# Patient Record
Sex: FEMALE | Race: WHITE | NOT HISPANIC OR LATINO | URBAN - METROPOLITAN AREA
[De-identification: names, ages, dates, MRNs, and addresses within clinical notes are randomized per-mention and may not be internally consistent; named-entity substitution may affect disease eponyms.]

---

## 2017-02-15 ENCOUNTER — OUTPATIENT (OUTPATIENT)
Dept: OUTPATIENT SERVICES | Facility: HOSPITAL | Age: 77
LOS: 1 days | End: 2017-02-15
Payer: MEDICARE

## 2017-02-15 ENCOUNTER — APPOINTMENT (OUTPATIENT)
Dept: CT IMAGING | Facility: CLINIC | Age: 77
End: 2017-02-15

## 2017-02-15 DIAGNOSIS — Z00.8 ENCOUNTER FOR OTHER GENERAL EXAMINATION: ICD-10-CM

## 2017-02-15 PROCEDURE — 71250 CT THORAX DX C-: CPT

## 2017-08-30 ENCOUNTER — APPOINTMENT (OUTPATIENT)
Dept: INTERNAL MEDICINE | Facility: CLINIC | Age: 77
End: 2017-08-30
Payer: MEDICARE

## 2017-08-30 VITALS
BODY MASS INDEX: 26.29 KG/M2 | RESPIRATION RATE: 20 BRPM | WEIGHT: 154 LBS | TEMPERATURE: 97.8 F | HEIGHT: 64 IN | SYSTOLIC BLOOD PRESSURE: 142 MMHG | OXYGEN SATURATION: 96 % | HEART RATE: 92 BPM | DIASTOLIC BLOOD PRESSURE: 76 MMHG

## 2017-08-30 DIAGNOSIS — I10 ESSENTIAL (PRIMARY) HYPERTENSION: ICD-10-CM

## 2017-08-30 DIAGNOSIS — D47.2 MONOCLONAL GAMMOPATHY: ICD-10-CM

## 2017-08-30 PROCEDURE — 94729 DIFFUSING CAPACITY: CPT

## 2017-08-30 PROCEDURE — 99214 OFFICE O/P EST MOD 30 MIN: CPT | Mod: 25

## 2017-08-30 PROCEDURE — 94060 EVALUATION OF WHEEZING: CPT

## 2017-08-30 RX ORDER — TIOTROPIUM BROMIDE AND OLODATEROL 3.124; 2.736 UG/1; UG/1
2.5-2.5 SPRAY, METERED RESPIRATORY (INHALATION)
Refills: 0 | Status: DISCONTINUED | COMMUNITY
End: 2017-08-30

## 2017-09-07 ENCOUNTER — MEDICATION RENEWAL (OUTPATIENT)
Age: 77
End: 2017-09-07

## 2018-02-19 ENCOUNTER — FORM ENCOUNTER (OUTPATIENT)
Age: 78
End: 2018-02-19

## 2018-02-20 ENCOUNTER — OUTPATIENT (OUTPATIENT)
Dept: OUTPATIENT SERVICES | Facility: HOSPITAL | Age: 78
LOS: 1 days | End: 2018-02-20
Payer: MEDICARE

## 2018-02-20 ENCOUNTER — APPOINTMENT (OUTPATIENT)
Dept: CT IMAGING | Facility: CLINIC | Age: 78
End: 2018-02-20
Payer: MEDICARE

## 2018-02-20 DIAGNOSIS — Z00.8 ENCOUNTER FOR OTHER GENERAL EXAMINATION: ICD-10-CM

## 2018-02-20 PROCEDURE — 71250 CT THORAX DX C-: CPT

## 2018-02-20 PROCEDURE — 71250 CT THORAX DX C-: CPT | Mod: 26

## 2018-02-25 ENCOUNTER — TRANSCRIPTION ENCOUNTER (OUTPATIENT)
Age: 78
End: 2018-02-25

## 2018-03-06 ENCOUNTER — APPOINTMENT (OUTPATIENT)
Dept: INTERNAL MEDICINE | Facility: CLINIC | Age: 78
End: 2018-03-06
Payer: MEDICARE

## 2018-03-06 VITALS
WEIGHT: 153 LBS | HEIGHT: 64 IN | TEMPERATURE: 98.1 F | OXYGEN SATURATION: 97 % | DIASTOLIC BLOOD PRESSURE: 80 MMHG | BODY MASS INDEX: 26.12 KG/M2 | HEART RATE: 88 BPM | SYSTOLIC BLOOD PRESSURE: 144 MMHG | RESPIRATION RATE: 18 BRPM

## 2018-03-06 PROCEDURE — 94060 EVALUATION OF WHEEZING: CPT

## 2018-03-06 PROCEDURE — 94727 GAS DIL/WSHOT DETER LNG VOL: CPT

## 2018-03-06 PROCEDURE — 99215 OFFICE O/P EST HI 40 MIN: CPT | Mod: 25

## 2018-03-06 PROCEDURE — 94729 DIFFUSING CAPACITY: CPT

## 2018-03-06 RX ORDER — METOPROLOL SUCCINATE 25 MG/1
25 TABLET, EXTENDED RELEASE ORAL DAILY
Refills: 0 | Status: ACTIVE | COMMUNITY

## 2018-03-06 RX ORDER — AMLODIPINE BESYLATE 10 MG/1
10 TABLET ORAL DAILY
Refills: 0 | Status: ACTIVE | COMMUNITY

## 2018-03-13 ENCOUNTER — APPOINTMENT (OUTPATIENT)
Dept: INTERNAL MEDICINE | Facility: CLINIC | Age: 78
End: 2018-03-13

## 2018-08-22 ENCOUNTER — FORM ENCOUNTER (OUTPATIENT)
Age: 78
End: 2018-08-22

## 2018-08-23 ENCOUNTER — APPOINTMENT (OUTPATIENT)
Dept: CT IMAGING | Facility: CLINIC | Age: 78
End: 2018-08-23
Payer: MEDICARE

## 2018-08-23 ENCOUNTER — OUTPATIENT (OUTPATIENT)
Dept: OUTPATIENT SERVICES | Facility: HOSPITAL | Age: 78
LOS: 1 days | End: 2018-08-23
Payer: MEDICARE

## 2018-08-23 DIAGNOSIS — Z00.8 ENCOUNTER FOR OTHER GENERAL EXAMINATION: ICD-10-CM

## 2018-08-23 PROCEDURE — 71250 CT THORAX DX C-: CPT

## 2018-08-23 PROCEDURE — 71250 CT THORAX DX C-: CPT | Mod: 26

## 2018-09-11 ENCOUNTER — APPOINTMENT (OUTPATIENT)
Dept: INTERNAL MEDICINE | Facility: CLINIC | Age: 78
End: 2018-09-11
Payer: MEDICARE

## 2018-09-11 VITALS
HEIGHT: 64 IN | WEIGHT: 158 LBS | BODY MASS INDEX: 26.98 KG/M2 | HEART RATE: 82 BPM | DIASTOLIC BLOOD PRESSURE: 80 MMHG | OXYGEN SATURATION: 96 % | SYSTOLIC BLOOD PRESSURE: 148 MMHG | RESPIRATION RATE: 16 BRPM | TEMPERATURE: 98.3 F

## 2018-09-11 PROCEDURE — 94729 DIFFUSING CAPACITY: CPT

## 2018-09-11 PROCEDURE — 99214 OFFICE O/P EST MOD 30 MIN: CPT | Mod: 25

## 2018-09-11 PROCEDURE — 94060 EVALUATION OF WHEEZING: CPT

## 2018-09-11 NOTE — DATA REVIEWED
[FreeTextEntry1] : Spirometric analysis with DLCO is performed. The vital capacity and flow rates are within normal limits. Bronchodilator reactivity is demonstrated. The DLCO is mildly reduced. Saturation is maintained. This represents a normal vital capacity and flow rates with a mild diffusion abnormality.\par \par CAT scan of the chest performed on 8/23/18 is reviewed. There are bilateral areas of bronchiectasis with emphysematous changes. Bilateral tree in bud opacities are present and are primarily unchanged when compared to the prior study of 2/20/18. There is slight increase in the right lower lung zone and lingula with regards to opacities. There is a new peripheral groundglass opacity measuring 8 mm in the left upper lung zone with an adjacent smaller groundglass opacity as well.

## 2018-09-11 NOTE — PLAN
[FreeTextEntry1] : 1. Continue with regimen as outlined above.\par \par 2. 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 At this time, given the fact that the majority of the findings on her CAT scan have not changed, we will continue to observe. There is a new 8mm abnormality which is groundglass with a possible solid component in the left upper lung field peripherally. An adjacent area is noted to this as well. The patient continues to remain asymptomatic, so I feel that continued observation is acceptable at this time. To this end, however, she'll undergo repeat CAT scan once again in another 6 months to reevaluate the changes. These areas have had fleeting, waxing and waning infiltrates over the past few years.\par \par 4. The medical followup with her primary care physician Dr. Shay. 5. Follow up with myself in 6 months with full pulmonary function testing and review of the most recent CAT scan of the chest at that time.

## 2018-09-11 NOTE — HISTORY OF PRESENT ILLNESS
[de-identified] : The patient comes in today for a followup evaluation, and reassessment of her pulmonary status.\par \par Overall, from a pulmonary standpoint, the patient states that she remains stable. She continues to be compliant with her Stiolto, for treatment of her underlying COPD. She remains active, and she walks on a fairly regular basis. She denies any overt dyspnea or wheezing. She denies chest pain or pleuritic pain.\par \par With regards to her bilateral tree in bud pulmonary infiltrates, she states that she also remains asymptomatic. She denies any cough or sputum production. There have been no fevers, chills, or night sweats. She remains compliant with her clarithromycin 500 mg 3 times per week. She is not having any GI distress or upset from this medication. He continues to be followed by her primary care physician. She now comes in for this assessment.

## 2018-09-11 NOTE — PHYSICAL EXAM
[General Appearance - Alert] : alert [General Appearance - In No Acute Distress] : in no acute distress [Outer Ear] : the ears and nose were normal in appearance [Oropharynx] : the oropharynx was normal [Neck Appearance] : the appearance of the neck was normal [Neck Cervical Mass (___cm)] : no neck mass was observed [Jugular Venous Distention Increased] : there was no jugular-venous distention [Thyroid Diffuse Enlargement] : the thyroid was not enlarged [Thyroid Nodule] : there were no palpable thyroid nodules [Heart Rate And Rhythm] : heart rate was normal and rhythm regular [Heart Sounds] : normal S1 and S2 [Heart Sounds Gallop] : no gallops [Murmurs] : no murmurs [Heart Sounds Pericardial Friction Rub] : no pericardial rub [FreeTextEntry1] : There is trace ankle edema [Bowel Sounds] : normal bowel sounds [Abdomen Soft] : soft [Abdomen Tenderness] : non-tender [] : no hepato-splenomegaly [Abdomen Mass (___ Cm)] : no abdominal mass palpated [Cervical Lymph Nodes Enlarged Posterior Bilaterally] : posterior cervical [Cervical Lymph Nodes Enlarged Anterior Bilaterally] : anterior cervical [Supraclavicular Lymph Nodes Enlarged Bilaterally] : supraclavicular [Nail Clubbing] : no clubbing  or cyanosis of the fingernails

## 2018-09-17 ENCOUNTER — MEDICATION RENEWAL (OUTPATIENT)
Age: 78
End: 2018-09-17

## 2018-09-17 ENCOUNTER — RX RENEWAL (OUTPATIENT)
Age: 78
End: 2018-09-17

## 2019-03-12 ENCOUNTER — FORM ENCOUNTER (OUTPATIENT)
Age: 79
End: 2019-03-12

## 2019-03-13 ENCOUNTER — OUTPATIENT (OUTPATIENT)
Dept: OUTPATIENT SERVICES | Facility: HOSPITAL | Age: 79
LOS: 1 days | End: 2019-03-13
Payer: MEDICARE

## 2019-03-13 ENCOUNTER — APPOINTMENT (OUTPATIENT)
Dept: CT IMAGING | Facility: CLINIC | Age: 79
End: 2019-03-13
Payer: MEDICARE

## 2019-03-13 DIAGNOSIS — Z00.8 ENCOUNTER FOR OTHER GENERAL EXAMINATION: ICD-10-CM

## 2019-03-13 PROCEDURE — 71250 CT THORAX DX C-: CPT

## 2019-03-13 PROCEDURE — 71250 CT THORAX DX C-: CPT | Mod: 26

## 2019-04-09 ENCOUNTER — APPOINTMENT (OUTPATIENT)
Dept: INTERNAL MEDICINE | Facility: CLINIC | Age: 79
End: 2019-04-09
Payer: MEDICARE

## 2019-04-09 ENCOUNTER — TRANSCRIPTION ENCOUNTER (OUTPATIENT)
Age: 79
End: 2019-04-09

## 2019-04-09 VITALS
HEIGHT: 64 IN | WEIGHT: 158 LBS | BODY MASS INDEX: 26.98 KG/M2 | TEMPERATURE: 98.1 F | RESPIRATION RATE: 16 BRPM | OXYGEN SATURATION: 96 % | HEART RATE: 84 BPM | SYSTOLIC BLOOD PRESSURE: 150 MMHG | DIASTOLIC BLOOD PRESSURE: 82 MMHG

## 2019-04-09 PROCEDURE — ZZZZZ: CPT

## 2019-04-09 PROCEDURE — 99215 OFFICE O/P EST HI 40 MIN: CPT | Mod: 25

## 2019-04-09 PROCEDURE — 94060 EVALUATION OF WHEEZING: CPT

## 2019-04-09 PROCEDURE — 94729 DIFFUSING CAPACITY: CPT

## 2019-04-09 PROCEDURE — 94727 GAS DIL/WSHOT DETER LNG VOL: CPT

## 2019-04-09 NOTE — PHYSICAL EXAM
[General Appearance - In No Acute Distress] : in no acute distress [General Appearance - Alert] : alert [PERRL With Normal Accommodation] : pupils were equal in size, round, and reactive to light [Sclera] : the sclera and conjunctiva were normal [Extraocular Movements] : extraocular movements were intact [Outer Ear] : the ears and nose were normal in appearance [Neck Cervical Mass (___cm)] : no neck mass was observed [Oropharynx] : the oropharynx was normal [Neck Appearance] : the appearance of the neck was normal [Thyroid Diffuse Enlargement] : the thyroid was not enlarged [Jugular Venous Distention Increased] : there was no jugular-venous distention [Thyroid Nodule] : there were no palpable thyroid nodules [Auscultation Breath Sounds / Voice Sounds] : lungs were clear to auscultation bilaterally [Heart Sounds] : normal S1 and S2 [Heart Sounds Gallop] : no gallops [Heart Rate And Rhythm] : heart rate was normal and rhythm regular [Heart Sounds Pericardial Friction Rub] : no pericardial rub [Murmurs] : no murmurs [Bowel Sounds] : normal bowel sounds [FreeTextEntry1] : There is trace ankle edema [Abdomen Soft] : soft [Abdomen Tenderness] : non-tender [] : no hepato-splenomegaly [Abdomen Mass (___ Cm)] : no abdominal mass palpated [Cervical Lymph Nodes Enlarged Posterior Bilaterally] : posterior cervical [No CVA Tenderness] : no ~M costovertebral angle tenderness [Supraclavicular Lymph Nodes Enlarged Bilaterally] : supraclavicular [Cervical Lymph Nodes Enlarged Anterior Bilaterally] : anterior cervical [Nail Clubbing] : no clubbing  or cyanosis of the fingernails

## 2019-04-09 NOTE — HISTORY OF PRESENT ILLNESS
[de-identified] : Patient comes in today for a followup evaluation, and a yearly reassessment of her pulmonary status.\par \par Overall, from a pulmonary standpoint, the patient states that she is doing well. She continues to remain compliant with her Stiolto, taking 2 puffs on a daily basis. This is for treatment of her underlying COPD. She does state that occasionally she produces sputum, but usually only once every 2 weeks. Sputum can be slightly discolored. She denies hemoptysis. She denies any dyspnea or dyspnea on exertion. She remains active but does not perform any formal type of exercise. There is no chest pain no pleuritic pain.\par \par With regards to the bilateral trend but abnormalities, she remains asymptomatic. She denies any fevers chills or night sweats. She continues to take Biaxin 500 mg 3 times per week. There have been no GI symptoms secondary to the medication. She continues to be followed by her primary care physician. She now comes in for this assessment.

## 2019-04-09 NOTE — PLAN
[FreeTextEntry1] : 1. Continue with medication as outlined above, including Stiolto and Biaxin.\par \par 2. At this time, given the relative stability of the bilateral. Both abnormalities, which are most likely infectious/inflammatory in nature (possibly do to atypical tuberculosis), we will then repeat a CAT scan of the chest in one year which would be March of 2020. There is no reason to perform a CAT scan in 6 months as there has been stability, and the previously documented 90 mm solid nodule, has completely resolved. This was most likely inflammatory in nature, possibly representing mucus plugging.\par \par 3. Routine medical followup with Dr. Shay.\par \par 4. Followup with myself in 6 months with pre-post spirometry and DLCO. We will schedule her for a followup CAT scan of the chest in March 2020 at that time.

## 2019-04-09 NOTE — DATA REVIEWED
[FreeTextEntry1] : A bone marrow function test is performed. Lung volumes are within normal limits with a slight decrease in the vital capacity. Lung mechanics are within normal limits. Minimal bronchodilator reactivity is demonstrated. The DLCO is mildly reduced. His saturations maintained. This represents a mild degree of restriction. A mild diffusion abnormality is noted.\par \par CAT scan of the chest are formed last month is reviewed. The patient still has bilateral tree in bud abnormalities with bronchiectatic changes. They are fairly stable when compared to the prior study performed in August of 2018. The previously documented left upper lung field more solid nodule that was seen in August, has completely resolved. This most likely represented an inflammatory region.

## 2019-04-11 ENCOUNTER — MEDICATION RENEWAL (OUTPATIENT)
Age: 79
End: 2019-04-11

## 2019-09-25 ENCOUNTER — MEDICATION RENEWAL (OUTPATIENT)
Age: 79
End: 2019-09-25

## 2019-11-12 ENCOUNTER — APPOINTMENT (OUTPATIENT)
Dept: INTERNAL MEDICINE | Facility: CLINIC | Age: 79
End: 2019-11-12
Payer: MEDICARE

## 2019-11-12 VITALS
WEIGHT: 154 LBS | OXYGEN SATURATION: 97 % | HEIGHT: 65 IN | BODY MASS INDEX: 25.66 KG/M2 | RESPIRATION RATE: 16 BRPM | DIASTOLIC BLOOD PRESSURE: 80 MMHG | HEART RATE: 82 BPM | SYSTOLIC BLOOD PRESSURE: 132 MMHG | TEMPERATURE: 98.1 F

## 2019-11-12 PROCEDURE — 94729 DIFFUSING CAPACITY: CPT

## 2019-11-12 PROCEDURE — ZZZZZ: CPT

## 2019-11-12 PROCEDURE — 94060 EVALUATION OF WHEEZING: CPT

## 2019-11-12 PROCEDURE — 99214 OFFICE O/P EST MOD 30 MIN: CPT | Mod: 25

## 2019-11-12 NOTE — HISTORY OF PRESENT ILLNESS
[de-identified] : This patient comes in today for a follow up evaluation of her pulmonary status.\par \par Overall, from a pulmonary standpoint, the patient states that she is doing well. She suffers from COPD and bronchiectasis. She has been compliant with her stiolto and clarithromycin medications for treatment. She notes small amounts of green sputum production at night. She states that her sputum production has decreased over time. She denies coughing, wheezing, SOB, and hemoptysis.\par \par The patient stays active at home but does not follow a formal exercise regimen. She denies dyspnea on exertion, CP, tightness, and palpitations. \par \par She has a history of tree-in-bud abnormalities. She states that she has been asymptomatic in this regard. \par \par She is up to date on her PNA vaccines and denies fevers, chills, night sweats and any other constitutional symptoms. She now comes in for this assessment.

## 2019-11-12 NOTE — PHYSICAL EXAM
[General Appearance - Alert] : alert [General Appearance - In No Acute Distress] : in no acute distress [Outer Ear] : the ears and nose were normal in appearance [Oropharynx] : the oropharynx was normal [Neck Appearance] : the appearance of the neck was normal [Neck Cervical Mass (___cm)] : no neck mass was observed [Jugular Venous Distention Increased] : there was no jugular-venous distention [Thyroid Diffuse Enlargement] : the thyroid was not enlarged [Thyroid Nodule] : there were no palpable thyroid nodules [Heart Rate And Rhythm] : heart rate was normal and rhythm regular [Heart Sounds] : normal S1 and S2 [Heart Sounds Gallop] : no gallops [Murmurs] : no murmurs [Heart Sounds Pericardial Friction Rub] : no pericardial rub [Bowel Sounds] : normal bowel sounds [Abdomen Soft] : soft [Abdomen Tenderness] : non-tender [] : no hepato-splenomegaly [Abdomen Mass (___ Cm)] : no abdominal mass palpated [Cervical Lymph Nodes Enlarged Posterior Bilaterally] : posterior cervical [Cervical Lymph Nodes Enlarged Anterior Bilaterally] : anterior cervical [Supraclavicular Lymph Nodes Enlarged Bilaterally] : supraclavicular [Nail Clubbing] : no clubbing  or cyanosis of the fingernails [Auscultation Breath Sounds / Voice Sounds] : lungs were clear to auscultation bilaterally [FreeTextEntry1] : There is trace ankle edema

## 2019-11-12 NOTE — DATA REVIEWED
[FreeTextEntry1] : Spirometric analysis with DLCO is performed. The vital capacity is normal. Lung mechanics reveal a mild decrease in flow rates with mild to moderate bronchodilator reactivity. The DLCO is at the low limit of normal. The saturation is maintained. This represents a mild to moderate degree of obstruction with slight airway reactivity. Compared to the prior study the flow rates are comparable

## 2019-11-12 NOTE — ADDENDUM
Pt called c/o yeast infection and would like a diflucan. Advised pt per Dr dougherty will send in a diflucan   [FreeTextEntry1] : I, Kwasi Demarco, acted solely as a scribe for Dr. Jax Wakefield on this date 11/12/2019.

## 2019-11-12 NOTE — PLAN
[FreeTextEntry1] : 1. Continue with medication outlined above.\par \par 2. A cardiovascular exercise regimen was recommended.\par \par 3. Follow up with a CT scan of her chest in spring 2020 to re-evaluate bronchiectasis and tree-in-bud abnormalities.\par \par 4. Follow up with PCP, Dr. Corbin, for routine evaluation.\par \par 5. Follow up with myself in 6 months for a PFT.\par \par 6. Flu shot was recommended, but refused by the patient.

## 2019-11-12 NOTE — END OF VISIT
[FreeTextEntry3] : All medical record entries made by the scribe were at my, Dr. Jax Wakefield, direction and personally dictated by me on 11/12/2019. I have reviewed the chart and agree that the record accurately reflects my personal performance of the history, physical exam, assessment and plan. I have also personally directed, reviewed, and agreed with the chart.

## 2020-05-26 ENCOUNTER — OUTPATIENT (OUTPATIENT)
Dept: OUTPATIENT SERVICES | Facility: HOSPITAL | Age: 80
LOS: 1 days | End: 2020-05-26
Payer: MEDICARE

## 2020-05-26 ENCOUNTER — RESULT REVIEW (OUTPATIENT)
Age: 80
End: 2020-05-26

## 2020-05-26 ENCOUNTER — APPOINTMENT (OUTPATIENT)
Dept: CT IMAGING | Facility: CLINIC | Age: 80
End: 2020-05-26
Payer: MEDICARE

## 2020-05-26 DIAGNOSIS — Z00.8 ENCOUNTER FOR OTHER GENERAL EXAMINATION: ICD-10-CM

## 2020-05-26 DIAGNOSIS — R91.8 OTHER NONSPECIFIC ABNORMAL FINDING OF LUNG FIELD: ICD-10-CM

## 2020-05-26 DIAGNOSIS — J47.9 BRONCHIECTASIS, UNCOMPLICATED: ICD-10-CM

## 2020-05-26 PROCEDURE — 71250 CT THORAX DX C-: CPT | Mod: 26

## 2020-05-26 PROCEDURE — 71250 CT THORAX DX C-: CPT

## 2020-06-08 ENCOUNTER — APPOINTMENT (OUTPATIENT)
Dept: INTERNAL MEDICINE | Facility: CLINIC | Age: 80
End: 2020-06-08
Payer: MEDICARE

## 2020-06-08 VITALS
TEMPERATURE: 98 F | HEART RATE: 82 BPM | SYSTOLIC BLOOD PRESSURE: 146 MMHG | DIASTOLIC BLOOD PRESSURE: 72 MMHG | WEIGHT: 153 LBS | HEIGHT: 65 IN | OXYGEN SATURATION: 96 % | RESPIRATION RATE: 18 BRPM | BODY MASS INDEX: 25.49 KG/M2

## 2020-06-08 PROCEDURE — 99214 OFFICE O/P EST MOD 30 MIN: CPT

## 2020-06-08 NOTE — PLAN
[FreeTextEntry1] : 1. Continue with medication as outlined above, including Stiolto QD and clarithromycin 500 mg t.i.w.\par \par 2. The patient will undergo a repeat CAT scan of the chest in the next 4-5 months to reevaluate the new findings in the left upper lungs, and at the right lateral base.\par \par 3. Continue with over-the-counter allergy medications as needed for seasonal allergies.\par \par 4. Mucinex on an as-needed basis as a mucolytic agent.\par \par 5. Followup with her primary care physician, Dr. Corbin.\par \par 6. Follow up with myself in 6 months with full pulmonary function testing. We will review the followup CAT scan of the chest at that time as well.

## 2020-06-08 NOTE — PHYSICAL EXAM
[General Appearance - Alert] : alert [General Appearance - In No Acute Distress] : in no acute distress [Outer Ear] : the ears and nose were normal in appearance [Oropharynx] : the oropharynx was normal [Neck Appearance] : the appearance of the neck was normal [Neck Cervical Mass (___cm)] : no neck mass was observed [Jugular Venous Distention Increased] : there was no jugular-venous distention [Thyroid Diffuse Enlargement] : the thyroid was not enlarged [Thyroid Nodule] : there were no palpable thyroid nodules [Auscultation Breath Sounds / Voice Sounds] : lungs were clear to auscultation bilaterally [Heart Rate And Rhythm] : heart rate was normal and rhythm regular [Heart Sounds] : normal S1 and S2 [Heart Sounds Gallop] : no gallops [Murmurs] : no murmurs [Heart Sounds Pericardial Friction Rub] : no pericardial rub [FreeTextEntry1] : There is trace ankle edema [Bowel Sounds] : normal bowel sounds [Abdomen Soft] : soft [Abdomen Tenderness] : non-tender [] : no hepato-splenomegaly [Abdomen Mass (___ Cm)] : no abdominal mass palpated [Cervical Lymph Nodes Enlarged Posterior Bilaterally] : posterior cervical [Cervical Lymph Nodes Enlarged Anterior Bilaterally] : anterior cervical [Supraclavicular Lymph Nodes Enlarged Bilaterally] : supraclavicular [Nail Clubbing] : no clubbing  or cyanosis of the fingernails

## 2020-06-08 NOTE — HISTORY OF PRESENT ILLNESS
[de-identified] : The patient comes in today for a followup evaluation and reassessment of her pulmonary status.\par \par Overall, from a pulmonary standpoint, the patient remains stable. She continues to be compliant with her Stiolto on a daily basis. She takes 2 puffs once a day. She also continues with the clarithromycin 500 mg 3 times a week for treatment of her bronchiectasis. She denies any cough, wheeze, or sputum production at this time.\par \par The patient did undergo a followup CAT scan of the chest. She is noted to have bilateral diffuse tree in bud abnormalities, that are felt to possibly be due to mucous plugging. She did undergo bronchoscopy over 4 years ago, and the results of all the cultures were negative. There have been no recent fevers, chills, night sweats, or any other constitutional symptoms. \par \par The patient notes having mild allergy mediated symptoms at this time. She does treat herself with over-the-counter preparations with good results. She now comes in for this assessment.

## 2020-06-08 NOTE — DATA REVIEWED
[FreeTextEntry1] : CAT scan of the chest is reviewed. There are bilateral tree in a bad abnormalities noted, most of which are unchanged. There is a new area in the left upper lung field which is slightly more prominent than on the prior study. There is also an area at the right lung base which is more opacified, possibly representing an area of atelectasis.

## 2020-09-16 ENCOUNTER — TRANSCRIPTION ENCOUNTER (OUTPATIENT)
Age: 80
End: 2020-09-16

## 2021-02-03 ENCOUNTER — APPOINTMENT (OUTPATIENT)
Dept: CT IMAGING | Facility: CLINIC | Age: 81
End: 2021-02-03
Payer: MEDICARE

## 2021-02-03 ENCOUNTER — OUTPATIENT (OUTPATIENT)
Dept: OUTPATIENT SERVICES | Facility: HOSPITAL | Age: 81
LOS: 1 days | End: 2021-02-03
Payer: MEDICARE

## 2021-02-03 ENCOUNTER — NON-APPOINTMENT (OUTPATIENT)
Age: 81
End: 2021-02-03

## 2021-02-03 DIAGNOSIS — J47.9 BRONCHIECTASIS, UNCOMPLICATED: ICD-10-CM

## 2021-02-03 DIAGNOSIS — J44.9 CHRONIC OBSTRUCTIVE PULMONARY DISEASE, UNSPECIFIED: ICD-10-CM

## 2021-02-03 DIAGNOSIS — R91.8 OTHER NONSPECIFIC ABNORMAL FINDING OF LUNG FIELD: ICD-10-CM

## 2021-02-03 PROCEDURE — 71250 CT THORAX DX C-: CPT | Mod: 26,MH

## 2021-02-03 PROCEDURE — 71250 CT THORAX DX C-: CPT

## 2021-02-22 ENCOUNTER — APPOINTMENT (OUTPATIENT)
Dept: INTERNAL MEDICINE | Facility: CLINIC | Age: 81
End: 2021-02-22
Payer: MEDICARE

## 2021-02-22 VITALS
DIASTOLIC BLOOD PRESSURE: 82 MMHG | OXYGEN SATURATION: 95 % | HEART RATE: 82 BPM | WEIGHT: 154 LBS | RESPIRATION RATE: 16 BRPM | SYSTOLIC BLOOD PRESSURE: 130 MMHG | HEIGHT: 65 IN | TEMPERATURE: 98.2 F | BODY MASS INDEX: 25.66 KG/M2

## 2021-02-22 DIAGNOSIS — Z20.822 ENCOUNTER FOR PREPROCEDURAL LABORATORY EXAMINATION: ICD-10-CM

## 2021-02-22 DIAGNOSIS — Z01.812 ENCOUNTER FOR PREPROCEDURAL LABORATORY EXAMINATION: ICD-10-CM

## 2021-02-22 PROCEDURE — 99215 OFFICE O/P EST HI 40 MIN: CPT

## 2021-02-22 NOTE — PLAN
[FreeTextEntry1] : 1. Continued medications as outlined above, including Biaxin for treatment of her bronchiectasis, and Stiolto, treatment of her COPD.\par \par The patient will use Mucinex as needed only\par \par 3. Continue to monitor oxygen saturation at home\par \par 4. The patient will return in 2 weeks for full pulmonary function studies, to assess her physiologically.\par \par 5. Followup in 6 months with pre-and post spirometry and O2 saturation. At that time, we will order the followup CAT scan of the chest, which will be performed in February of 2022.\par \par 6. Exercise regimen has been recommended\par \par 7. Routine medical followup with her primary care physician, Dr. Corbin.

## 2021-02-22 NOTE — HISTORY OF PRESENT ILLNESS
[FreeTextEntry1] : The patient comes in for a yearly pulmonary evaluation\par  [de-identified] : The patient is doing relatively well from a pulmonary standpoint. She remains on Stiolto for her underlying COPD. She notes that her oxygen saturations are between 95 and 96%. . She does have an oximeter at home. She has not seen any purulent sputum.\par \par With regards to her underlying bronchiectasis, she has again remained stable. She does not take Mucinex unless it is absolutely necessary with regards to increased secretions. She notes having only occasional sputum production. She remains on Biaxin for treatment of bronchiectasis. She now comes in to assess

## 2021-02-22 NOTE — PHYSICAL EXAM
[Coordination Grossly Intact] : coordination grossly intact [Deep Tendon Reflexes (DTR)] : deep tendon reflexes were 2+ and symmetric [Normal Affect] : the affect was normal [Normal Insight/Judgement] : insight and judgment were intact [General Appearance - Alert] : alert [General Appearance - In No Acute Distress] : in no acute distress [Sclera] : the sclera and conjunctiva were normal [PERRL With Normal Accommodation] : pupils were equal in size, round, and reactive to light [Extraocular Movements] : extraocular movements were intact [Outer Ear] : the ears and nose were normal in appearance [Oropharynx] : the oropharynx was normal [Neck Appearance] : the appearance of the neck was normal [Neck Cervical Mass (___cm)] : no neck mass was observed [Jugular Venous Distention Increased] : there was no jugular-venous distention [Thyroid Diffuse Enlargement] : the thyroid was not enlarged [Thyroid Nodule] : there were no palpable thyroid nodules [Auscultation Breath Sounds / Voice Sounds] : lungs were clear to auscultation bilaterally [Heart Rate And Rhythm] : heart rate was normal and rhythm regular [Heart Sounds] : normal S1 and S2 [Heart Sounds Gallop] : no gallops [Murmurs] : no murmurs [Heart Sounds Pericardial Friction Rub] : no pericardial rub [Bowel Sounds] : normal bowel sounds [Abdomen Soft] : soft [Abdomen Tenderness] : non-tender [] : no hepato-splenomegaly [Abdomen Mass (___ Cm)] : no abdominal mass palpated [Cervical Lymph Nodes Enlarged Posterior Bilaterally] : posterior cervical [Cervical Lymph Nodes Enlarged Anterior Bilaterally] : anterior cervical [Supraclavicular Lymph Nodes Enlarged Bilaterally] : supraclavicular [No CVA Tenderness] : no ~M costovertebral angle tenderness [Nail Clubbing] : no clubbing  or cyanosis of the fingernails [FreeTextEntry1] : There is trace ankle edema

## 2021-04-23 LAB — SARS-COV-2 N GENE NPH QL NAA+PROBE: NOT DETECTED

## 2021-04-26 ENCOUNTER — APPOINTMENT (OUTPATIENT)
Dept: INTERNAL MEDICINE | Facility: CLINIC | Age: 81
End: 2021-04-26
Payer: MEDICARE

## 2021-04-26 VITALS
RESPIRATION RATE: 16 BRPM | HEART RATE: 83 BPM | TEMPERATURE: 98.3 F | WEIGHT: 152 LBS | BODY MASS INDEX: 25.95 KG/M2 | OXYGEN SATURATION: 96 % | DIASTOLIC BLOOD PRESSURE: 80 MMHG | SYSTOLIC BLOOD PRESSURE: 146 MMHG | HEIGHT: 64 IN

## 2021-04-26 PROCEDURE — 94010 BREATHING CAPACITY TEST: CPT

## 2021-04-26 PROCEDURE — 94729 DIFFUSING CAPACITY: CPT

## 2021-04-26 PROCEDURE — 94727 GAS DIL/WSHOT DETER LNG VOL: CPT

## 2021-04-27 ENCOUNTER — NON-APPOINTMENT (OUTPATIENT)
Age: 81
End: 2021-04-27

## 2021-05-28 ENCOUNTER — RX RENEWAL (OUTPATIENT)
Age: 81
End: 2021-05-28

## 2021-08-03 ENCOUNTER — TRANSCRIPTION ENCOUNTER (OUTPATIENT)
Age: 81
End: 2021-08-03

## 2021-08-05 ENCOUNTER — RX RENEWAL (OUTPATIENT)
Age: 81
End: 2021-08-05

## 2021-08-24 ENCOUNTER — TRANSCRIPTION ENCOUNTER (OUTPATIENT)
Age: 81
End: 2021-08-24

## 2021-10-14 ENCOUNTER — APPOINTMENT (OUTPATIENT)
Dept: INTERNAL MEDICINE | Facility: CLINIC | Age: 81
End: 2021-10-14

## 2021-11-11 ENCOUNTER — APPOINTMENT (OUTPATIENT)
Dept: INTERNAL MEDICINE | Facility: CLINIC | Age: 81
End: 2021-11-11
Payer: MEDICARE

## 2021-11-11 VITALS
WEIGHT: 154 LBS | BODY MASS INDEX: 26.29 KG/M2 | SYSTOLIC BLOOD PRESSURE: 150 MMHG | OXYGEN SATURATION: 97 % | DIASTOLIC BLOOD PRESSURE: 70 MMHG | HEART RATE: 81 BPM | RESPIRATION RATE: 16 BRPM | TEMPERATURE: 98 F | HEIGHT: 64 IN

## 2021-11-11 PROCEDURE — 99214 OFFICE O/P EST MOD 30 MIN: CPT

## 2021-11-11 NOTE — HISTORY OF PRESENT ILLNESS
[FreeTextEntry1] : The patient comes in today for a routine Pulmonary followup evaluation.\par  [de-identified] : The patient states, that from a pulmonary standpoint, she is doing well. She continues to be followed carefully by her primary care physician. With regards to her underlying obstructive lung disease, she remains compliant with her medical regimen Stiolto. She freely monitors her oxygen saturations. She denies any cough or sputum production at present.\par \par With regards to her underlying bronchiectasis, she continues with Biaxin 500 mg 3 times a week. She does not use any mucolytic agents. She does not exercise. She remains active. There is no chest pain no pleuritic pain.

## 2021-11-11 NOTE — PLAN
[FreeTextEntry1] : 1. Bronchodilators and Biaxin for treatment of her bronchiectasis.\par \par 2. The patient will use Mucinex only as needed\par \par 3. Repeat CAT scan of the chest we performed in February to reevaluate the previously documented free in nodular densities.\par \par 4. Follow up with myself in 6 months with full pulmonary function testing and a review of the most recent CAT scan.\par \par 5. The patient refuses flu vaccination\par \par 6. Routine follow up with her primary care physician, Dr. Corbin.

## 2022-04-21 ENCOUNTER — OUTPATIENT (OUTPATIENT)
Dept: OUTPATIENT SERVICES | Facility: HOSPITAL | Age: 82
LOS: 1 days | End: 2022-04-21
Payer: MEDICARE

## 2022-04-21 ENCOUNTER — APPOINTMENT (OUTPATIENT)
Dept: CT IMAGING | Facility: CLINIC | Age: 82
End: 2022-04-21
Payer: MEDICARE

## 2022-04-21 DIAGNOSIS — R91.8 OTHER NONSPECIFIC ABNORMAL FINDING OF LUNG FIELD: ICD-10-CM

## 2022-04-21 DIAGNOSIS — J47.9 BRONCHIECTASIS, UNCOMPLICATED: ICD-10-CM

## 2022-04-21 PROCEDURE — G1004: CPT

## 2022-04-21 PROCEDURE — 71250 CT THORAX DX C-: CPT | Mod: ME

## 2022-04-21 PROCEDURE — 71250 CT THORAX DX C-: CPT | Mod: 26,ME

## 2022-05-11 ENCOUNTER — NON-APPOINTMENT (OUTPATIENT)
Age: 82
End: 2022-05-11

## 2022-05-23 ENCOUNTER — APPOINTMENT (OUTPATIENT)
Dept: INTERNAL MEDICINE | Facility: CLINIC | Age: 82
End: 2022-05-23
Payer: MEDICARE

## 2022-05-23 VITALS
BODY MASS INDEX: 25.61 KG/M2 | HEIGHT: 64 IN | WEIGHT: 150 LBS | RESPIRATION RATE: 18 BRPM | DIASTOLIC BLOOD PRESSURE: 74 MMHG | OXYGEN SATURATION: 94 % | SYSTOLIC BLOOD PRESSURE: 143 MMHG | HEART RATE: 80 BPM | TEMPERATURE: 98.1 F

## 2022-05-23 DIAGNOSIS — J47.9 BRONCHIECTASIS, UNCOMPLICATED: ICD-10-CM

## 2022-05-23 DIAGNOSIS — J98.4 OTHER DISORDERS OF LUNG: ICD-10-CM

## 2022-05-23 DIAGNOSIS — J44.9 CHRONIC OBSTRUCTIVE PULMONARY DISEASE, UNSPECIFIED: ICD-10-CM

## 2022-05-23 DIAGNOSIS — R91.8 OTHER NONSPECIFIC ABNORMAL FINDING OF LUNG FIELD: ICD-10-CM

## 2022-05-23 DIAGNOSIS — Z87.891 PERSONAL HISTORY OF NICOTINE DEPENDENCE: ICD-10-CM

## 2022-05-23 DIAGNOSIS — R05.9 COUGH, UNSPECIFIED: ICD-10-CM

## 2022-05-23 DIAGNOSIS — U07.1 COVID-19: ICD-10-CM

## 2022-05-23 PROCEDURE — 94060 EVALUATION OF WHEEZING: CPT

## 2022-05-23 PROCEDURE — ZZZZZ: CPT

## 2022-05-23 PROCEDURE — 94729 DIFFUSING CAPACITY: CPT

## 2022-05-23 PROCEDURE — 94727 GAS DIL/WSHOT DETER LNG VOL: CPT

## 2022-05-23 PROCEDURE — 99215 OFFICE O/P EST HI 40 MIN: CPT | Mod: 25

## 2022-05-23 RX ORDER — CLARITHROMYCIN 500 MG/1
500 TABLET, FILM COATED ORAL
Qty: 36 | Refills: 3 | Status: ACTIVE | COMMUNITY
Start: 1900-01-01 | End: 1900-01-01

## 2022-05-23 NOTE — PLAN
[FreeTextEntry1] : 1.  Continue with medical regimen as outlined above.\par \par 2.  Given the fact that her CAT scan has remained relatively stable, when compared to the prior study, we will now recommend a repeat CAT scan in 2 years, which would be the spring 2024.  The previously documented waxing and waning pulmonary infiltrates, most likely due to mucous plugging from her bronchiectasis.\par \par 3.  Increasing cardiovascular exercise regimen has been recommended\par \par 4.  Routine medical follow-up with her primary care physician, Dr. Corbin\par \par 5.  Follow-up with myself in 6 months with pre-Po spirometry and DLCO.\par \par Addendum: The patient now called back on the evening of her visit which was earlier today.  She now tells us, that she had COVID approximately 2 weeks ago.  She did not want to tell anyone in the office.  She did test negative several days prior to this visit.  She is now questioning, as to why she needs to undergo repeat DLCO at the time of next visit.  I told her that the abnormal findings regarding the slight decrease in the DLCO, could be due to the recent infection, that she did not make me aware of her earlier in the day.  She will think about whether or not she will have this test done at the next visit.

## 2022-05-23 NOTE — HISTORY OF PRESENT ILLNESS
[FreeTextEntry1] : The patient comes in for a yearly pulmonary evaluation\par  [de-identified] : Patient states, that from a pulmonary standpoint, she is doing well.  She remains compliant with her medical regimen of Stiolto on a daily basis, as well as clarithromycin which she takes 3 times a week.  She does note that she does occasionally produce sputum.  It is random and sporadic as to when it occurs.  It is usually light yellow in color.  She denies having any hemoptysis.\par \par The patient remains active.  She does not perform any formal type of exercise other than occasionally walking.  She monitors her oxygen saturations at home, and they range between 94 to 96%.  She did undergo a follow-up surveillance CAT scan of the chest.  She has had waxing and waning infiltrates in the past.  She now comes in for this assessment.

## 2022-05-23 NOTE — DATA REVIEWED
[FreeTextEntry1] : A pulmonary function test is performed.  Lung volumes are within normal limits.  Lung mechanics reveal a mild to moderate decrease in flow rates.  Mild bronchodilator reactivity is demonstrated.  The DLCO is moderately reduced to 56%, and when corrected for alveolar volume it is normal.  The saturation is 94%.  This represents a mild to moderate degree of obstruction with airway reactivity.  When compared to the prior study, the flow rates have improved slightly.  There is a mild diffusion abnormality which persists.  This is slightly decreased however.\par \par CAT scan of the chest is reviewed at length with the patient.  Bilateral tree-in-bud abnormalities are noted in addition to bronchiectasis.

## 2022-08-19 ENCOUNTER — TRANSCRIPTION ENCOUNTER (OUTPATIENT)
Age: 82
End: 2022-08-19

## 2022-12-13 ENCOUNTER — TRANSCRIPTION ENCOUNTER (OUTPATIENT)
Age: 82
End: 2022-12-13

## 2022-12-16 ENCOUNTER — APPOINTMENT (OUTPATIENT)
Dept: INTERNAL MEDICINE | Facility: CLINIC | Age: 82
End: 2022-12-16

## 2023-04-11 ENCOUNTER — NON-APPOINTMENT (OUTPATIENT)
Age: 83
End: 2023-04-11

## 2023-08-04 ENCOUNTER — APPOINTMENT (OUTPATIENT)
Dept: CT IMAGING | Facility: CLINIC | Age: 83
End: 2023-08-04
Payer: MEDICARE

## 2023-08-04 ENCOUNTER — OUTPATIENT (OUTPATIENT)
Dept: OUTPATIENT SERVICES | Facility: HOSPITAL | Age: 83
LOS: 1 days | End: 2023-08-04
Payer: MEDICARE

## 2023-08-04 DIAGNOSIS — J47.9 BRONCHIECTASIS, UNCOMPLICATED: ICD-10-CM

## 2023-08-04 PROCEDURE — 71250 CT THORAX DX C-: CPT | Mod: 26,MH

## 2023-08-04 PROCEDURE — 71250 CT THORAX DX C-: CPT | Mod: MH

## 2023-08-22 ENCOUNTER — RX RENEWAL (OUTPATIENT)
Age: 83
End: 2023-08-22

## 2023-09-25 ENCOUNTER — RX RENEWAL (OUTPATIENT)
Age: 83
End: 2023-09-25

## 2023-10-23 ENCOUNTER — RX RENEWAL (OUTPATIENT)
Age: 83
End: 2023-10-23

## 2023-10-23 RX ORDER — TIOTROPIUM BROMIDE AND OLODATEROL 3.124; 2.736 UG/1; UG/1
2.5-2.5 SPRAY, METERED RESPIRATORY (INHALATION) DAILY
Qty: 4 | Refills: 11 | Status: ACTIVE | COMMUNITY
Start: 2017-08-30 | End: 1900-01-01

## 2023-11-05 ENCOUNTER — NON-APPOINTMENT (OUTPATIENT)
Age: 83
End: 2023-11-05

## 2024-06-21 NOTE — DATA REVIEWED
Follow Up Visit Established Patient Visit    Patient:  Maryann Lozano                                               : 1949  Age: 74 y.o.  MRN: [id number]  Date : 2024    Maryann Lozano is a 74 y.o. female who presents for : Scheduled follow-up appointment.  74-year-old female with history of essential hypertension, bilateral lower extremity edema, chronic pain syndrome, left-sided hemiparesis secondary to previous acute CVA, major depression in partial remission, peripheral arterial disease, and coronary artery disease.    Chief Complaint   Patient presents with    Follow-up     Takes all her medications regularly.  Continues to have right hand tremor.  Tremor interferes with her ability to hold utensils and other objects in her hand.  Has been off gabapentin.    Undergoes physical therapy for bilateral lower extremity edema and balance training.  Reports that swelling is significantly better with use of physical therapy.  No recent falls.  Does not feel depressed.  Insomnia controlled with use of Ambien 5 mg nightly as needed.  Blood pressure is well-controlled with current medications.  Ambulates in an electric scooter. Prilosec is helpful for treatment of heartburn.    Past Medical History:   Diagnosis Date    Arthritis     Cerebral artery occlusion with cerebral infarction (HCC)     Compression fracture     back due to fall    Concussion     due to fall    DVT (deep venous thrombosis) (HCC) 2017    RLE after TKR    Environmental allergies     Essential hypertension, benign 2010    GERD (gastroesophageal reflux disease)     History of peptic ulcer     Hx of blood clots     Hyperlipidemia 2010    Lacunar infarction (HCC)     old - per MRI     MRSA (methicillin resistant staph aureus) culture positive 2018    knee    Restrictive airway disease     allergy induced    Retention of urine     Wound, open 2018    left shin area, healing well, tx in wound care center  [FreeTextEntry1] : A CAT scan of the chest, performed on 2/3/21, is reviewed extensively with the patient. Once again she was noted to have bronchiectatic changes with tree in bud opacities. The majority of these findings are unchanged, dating back to 2019 and 2020. There may be slight increased peripheral impaction in the right lower lung zone.  Other areas have actually improved.

## 2024-07-26 ENCOUNTER — EMERGENCY (EMERGENCY)
Facility: HOSPITAL | Age: 84
LOS: 0 days | Discharge: HOME | End: 2024-07-26
Attending: STUDENT IN AN ORGANIZED HEALTH CARE EDUCATION/TRAINING PROGRAM
Payer: MEDICARE

## 2024-07-26 VITALS
DIASTOLIC BLOOD PRESSURE: 71 MMHG | SYSTOLIC BLOOD PRESSURE: 154 MMHG | HEART RATE: 88 BPM | TEMPERATURE: 98 F | RESPIRATION RATE: 16 BRPM | OXYGEN SATURATION: 99 %

## 2024-07-26 VITALS — HEIGHT: 64 IN | WEIGHT: 130.07 LBS

## 2024-07-26 DIAGNOSIS — S32.10XA UNSPECIFIED FRACTURE OF SACRUM, INITIAL ENCOUNTER FOR CLOSED FRACTURE: ICD-10-CM

## 2024-07-26 DIAGNOSIS — S32.039A UNSPECIFIED FRACTURE OF THIRD LUMBAR VERTEBRA, INITIAL ENCOUNTER FOR CLOSED FRACTURE: ICD-10-CM
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
## 2024-07-26 DIAGNOSIS — S30.1XXA CONTUSION OF ABDOMINAL WALL, INITIAL ENCOUNTER: ICD-10-CM

## 2024-07-26 DIAGNOSIS — M54.9 DORSALGIA, UNSPECIFIED: ICD-10-CM
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
## 2024-07-26 DIAGNOSIS — Z88.0 ALLERGY STATUS TO PENICILLIN: ICD-10-CM

## 2024-07-26 DIAGNOSIS — Z88.1 ALLERGY STATUS TO OTHER ANTIBIOTIC AGENTS: ICD-10-CM

## 2024-07-26 DIAGNOSIS — Z88.8 ALLERGY STATUS TO OTHER DRUGS, MEDICAMENTS AND BIOLOGICAL SUBSTANCES: ICD-10-CM

## 2024-07-26 PROCEDURE — 72170 X-RAY EXAM OF PELVIS: CPT | Mod: 26

## 2024-07-26 PROCEDURE — 99285 EMERGENCY DEPT VISIT HI MDM: CPT

## 2024-07-26 PROCEDURE — 96374 THER/PROPH/DIAG INJ IV PUSH: CPT

## 2024-07-26 PROCEDURE — 72192 CT PELVIS W/O DYE: CPT | Mod: MC

## 2024-07-26 PROCEDURE — 76376 3D RENDER W/INTRP POSTPROCES: CPT

## 2024-07-26 PROCEDURE — 76376 3D RENDER W/INTRP POSTPROCES: CPT | Mod: 26

## 2024-07-26 PROCEDURE — 72192 CT PELVIS W/O DYE: CPT | Mod: 26,MC

## 2024-07-26 PROCEDURE — 72170 X-RAY EXAM OF PELVIS: CPT

## 2024-07-26 PROCEDURE — 72131 CT LUMBAR SPINE W/O DYE: CPT | Mod: MC

## 2024-07-26 PROCEDURE — 99284 EMERGENCY DEPT VISIT MOD MDM: CPT | Mod: 25

## 2024-07-26 PROCEDURE — 72131 CT LUMBAR SPINE W/O DYE: CPT | Mod: 26,MC

## 2024-07-26 RX ORDER — OXYCODONE AND ACETAMINOPHEN 5; 325 MG/1; MG/1
1 TABLET ORAL
Qty: 6 | Refills: 0
Start: 2024-07-26 | End: 2024-07-27

## 2024-07-26 RX ORDER — KETOROLAC TROMETHAMINE 30 MG/ML
15 INJECTION, SOLUTION INTRAMUSCULAR ONCE
Refills: 0 | Status: DISCONTINUED | OUTPATIENT
Start: 2024-07-26 | End: 2024-07-26

## 2024-07-26 RX ORDER — LIDOCAINE HCL 28 MG/G
1 GEL TOPICAL ONCE
Refills: 0 | Status: COMPLETED | OUTPATIENT
Start: 2024-07-26 | End: 2024-07-26

## 2024-07-26 RX ORDER — ACETAMINOPHEN 325 MG
975 TABLET ORAL ONCE
Refills: 0 | Status: COMPLETED | OUTPATIENT
Start: 2024-07-26 | End: 2024-07-26

## 2024-07-26 RX ORDER — METHOCARBAMOL 500 MG
750 TABLET ORAL ONCE
Refills: 0 | Status: COMPLETED | OUTPATIENT
Start: 2024-07-26 | End: 2024-07-26

## 2024-07-26 RX ADMIN — LIDOCAINE HCL 1 PATCH: 28 GEL TOPICAL at 12:42

## 2024-07-26 RX ADMIN — Medication 975 MILLIGRAM(S): at 12:43

## 2024-07-26 RX ADMIN — Medication 600 MILLIGRAM(S): at 12:44

## 2024-07-26 RX ADMIN — KETOROLAC TROMETHAMINE 15 MILLIGRAM(S): 30 INJECTION, SOLUTION INTRAMUSCULAR at 18:14

## 2024-07-26 RX ADMIN — Medication 750 MILLIGRAM(S): at 12:43

## 2024-07-26 NOTE — ED PROVIDER NOTE - CLINICAL SUMMARY MEDICAL DECISION MAKING FREE TEXT BOX
Presentation most consistent with sciatica. Will proceed with pain control, monitor, reassess. Presentation most consistent with sciatica. Will proceed with pain control, monitor, reassess.  CT imaging shows b/l sacral fractures and numerous non acute spine findings  I Dr. Ardon ED attending spoke with PA working with Dr. Sanchez spine surgery who recommends no acute intervention at this time  I consulted surgery resident who will assess pt for sacral fx

## 2024-07-26 NOTE — ED ADULT TRIAGE NOTE - CHIEF COMPLAINT QUOTE
patient presents with daughter c/o hip and lower back pain s/p fall about 10 days ago.  patient fell while walking to bathroom in the middle of the night.  reports walked into wall and fell backwards onto butt. c/o left hip pain and lower back pain.  daughter reports patient c/o right hip pain a few days ago.  no anticoagulation use

## 2024-07-26 NOTE — ED ADULT NURSE NOTE - CAS DISCH BELONGINGS RETURNED
[facility] Call Center    Phone Message    May a detailed message be left on voicemail: yes    Reason for Call: Other: Pt called and would like to schedule a HFU in the ID. Pt was discharged yesterday, 5/28/19. Pt requested to be scheduled in on 6/4/19 if possible. Please call back pt. Thanks.     Action Taken: Message routed to:  [facility] ([facility] ID    
Per Dr. Santillan, pt needs hosp f/u appt with ID. He can see pt on 6/12/19. Would also like repeat labs and abdominal CT with contrast prior to appt. Orders entered. Will have schedulers contact pt.  Rachelle Benitez RN    
Not applicable

## 2024-07-26 NOTE — ED PROVIDER NOTE - PROGRESS NOTE DETAILS
Layla Jackson DO Attending Physician: Patient received in signout from .  84 female here for bilateral sacral fractures and Patient cleared by Ortho for outpatient follow-up.  Weightbearing as tolerated.  Patient does not want to wait for physical therapy evaluation overnight.  States she will follow-up with the Ortho doctor and physical therapy outpatient. Daughter at bedside and agrees with plan.  States she has a walker at home.  And patient uses that. will DC with daughter.  Pt states that symptoms have improved. Pt requesting to go home.      Signs, symptoms, results were reviewed with patient.  Pt agreed to return if symptoms return and/or worsen.  Pt agrees to follow up with ortho and spine. Red Flags discussed with patient. Patient understands to follow-up at their scheduled appointment and take medications as prescribed. All this was discussed in layman's terms with the patient. Layla Jackson DO Attending Physician: Patient received in signout from .  84 female here for bilateral sacral fractures and L3 inferior end plate fx . cleared by spine for outpat. f/w . pending ortho eval for sacral fx.

## 2024-07-26 NOTE — ED ADULT NURSE NOTE - NSFALLRISKINTERV_ED_ALL_ED
Assistance OOB with selected safe patient handling equipment if applicable/Assistance with ambulation/Communicate fall risk and risk factors to all staff, patient, and family/Monitor gait and stability/Provide visual cue: yellow wristband, yellow gown, etc/Reinforce activity limits and safety measures with patient and family/Call bell, personal items and telephone in reach/Instruct patient to call for assistance before getting out of bed/chair/stretcher/Non-slip footwear applied when patient is off stretcher/[place] to call system/Physically safe environment - no spills, clutter or unnecessary equipment/Purposeful Proactive Rounding/Room/bathroom lighting operational, light cord in reach

## 2024-07-26 NOTE — ED ADULT NURSE NOTE - OBJECTIVE STATEMENT
Pt presents to ED AOx4 from home c/o left hip and lower back pain beginning x10 days ago after fall becoming unbearable yesterday and having difficulty walking today due to the pain. Pt states when she fell she was sleep walking and hit her head into a cabinet and then fall on to her buttocks. (-) blood thinners (-) head strike (-) LOC. Denies sob, chest pain, dizziness or n/v/d. PMhx HTN.

## 2024-07-26 NOTE — CONSULT NOTE ADULT - SUBJECTIVE AND OBJECTIVE BOX
HPI  84yFemale w/ sacral pain s/p mechanical fall 10 days ago. Able to ambulate until 2-3 days ago 2/2 pain. Denies headstrike or LOC. Denies numbness/tingling in the extremities. Denies any other trauma/injuries at this time. At baseline, community ambulator w/o assistive devices.    ROS  Negative unless otherwise specified in HPI.    PAST MEDICAL & SURGICAL Hx  PAST MEDICAL & SURGICAL HISTORY:      MEDICATIONS  Home Medications:      ALLERGIES  amoxicillin (Unknown)  Prilosec (Other)  Originally Entered as [Unknown] reaction to [ANTIBX] (Unknown)      FAMILY Hx  FAMILY HISTORY:      SOCIAL Hx  Social History:      VITALS  Vital Signs Last 24 Hrs  T(C): 36.8 (26 Jul 2024 11:15), Max: 36.8 (26 Jul 2024 11:15)  T(F): 98.2 (26 Jul 2024 11:15), Max: 98.2 (26 Jul 2024 11:15)  HR: 86 (26 Jul 2024 11:15) (86 - 86)  BP: 199/96 (26 Jul 2024 11:15) (199/96 - 199/96)  BP(mean): 127 (26 Jul 2024 11:15) (127 - 127)  RR: 18 (26 Jul 2024 11:15) (18 - 18)  SpO2: 98% (26 Jul 2024 11:15) (98% - 98%)    Parameters below as of 26 Jul 2024 11:15  Patient On (Oxygen Delivery Method): room air        PHYSICAL EXAM  Gen: Lying in bed, NAD  Resp: No increased WOB    LLE:  Skin intact, +edema and +ecchymosis over R groin  No TTP along extremity; compartments soft  Limited ROM of hip 2/2 pain  (-) Log roll test  No pain with axial loading  Motor: TA/EHL/GS/FHL intact  Sensory: DP/SP/Tib/Harpal/Saph SILT  +DP pulse, WWP    RLE:  Skin intact, +edema and +ecchymosis over R groin  No TTP along extremity; compartments soft  Limited ROM of hip 2/2 pain  (-) Log roll test  No pain with axial loading  Motor: TA/EHL/GS/FHL intact  Sensory: DP/SP/Tib/Harpal/Saph SILT  +DP pulse, WWP        Secondary survey:  No TTP along spine, TTP sacrum, no TTP on other extremities, pelvis grossly stable, SILT and compartments soft throughout    LABS              IMAGING  XRs: bilateral sacral insufficiency fxs.  CT: as above. L3 inferior end plate fx noted as well.    ASSESSMENT & PLAN  84yFemale w/ bilateral sacral insufficiency fx.  -WBAT  -pain control  -ice/cold compress  -PT/OT  -L3 fx management per spine Dr. Sanchez  -no acute ortho surgery at this time  -f/u outpt with Dr. Gallo in 1 week, call office for appt

## 2024-07-26 NOTE — ED ADULT NURSE REASSESSMENT NOTE - NS ED NURSE REASSESS COMMENT FT1
Pt taken to bathroom. Pt and family aware of POC to await ortho consult. Safety and comfort measures in place.

## 2024-07-26 NOTE — ED ADULT NURSE REASSESSMENT NOTE - NS ED NURSE REASSESS COMMENT FT1
Handoff report received from ERIBERTO Ly. Pt is in no acute distress. Pt is sitting comfortably in the stretcher with family at the bedside. Breathing is even and unlabored at this time.

## 2024-07-26 NOTE — ED PROVIDER NOTE - OBJECTIVE STATEMENT
85 y/o female presents to the ED c/o back pain s/p fall 10 days ago. Pt reports she was walking to the bathroom in the middle of the night and fell. Pain started radiating to LLE 2 days ago. Pt took Tylenol and Motrin at 8am without relief and came to the ED for evaluation. Denies bladder/bowel incontinence. No other complaints at this time.

## 2024-07-26 NOTE — ED PROVIDER NOTE - PHYSICAL EXAMINATION
Constitutional: well appearing, NAD AAOx3  Eyes: EOMI, PERRL  Head: Normocephalic atraumatic  Mouth: no airway obstruction, posterior oropharynx clear without erythema or exudate  Neck: supple  Cardiac: regular rate and rhythm, no MRG  Resp: Lungs CTAB  GI: Abd s/nt/nd  MSK: No midline tenderness of C, T,  L spine. b/l lumbar paraspinal tenderness. Left hip and knee nontender with normal ROM.   Neuro: CN2-12 intact, strength 5/5x4, sensation grossly intact  Skin: No rashes

## 2024-07-26 NOTE — ED PROVIDER NOTE - PATIENT PORTAL LINK FT
You can access the FollowMyHealth Patient Portal offered by [facility] by registering at the following website: [email or web address]. By joining [employer]’s FollowMyHealth portal, you will also be able to view your health information using other applications (apps) compatible with our system.

## 2024-07-30 ENCOUNTER — APPOINTMENT (OUTPATIENT)
Dept: ORTHOPEDIC SURGERY | Facility: CLINIC | Age: 84
End: 2024-07-30

## 2024-07-30 PROBLEM — Z78.9 OTHER SPECIFIED HEALTH STATUS: Chronic | Status: ACTIVE | Noted: 2024-07-29
